# Patient Record
Sex: FEMALE | Race: WHITE | NOT HISPANIC OR LATINO | Employment: FULL TIME | URBAN - METROPOLITAN AREA
[De-identification: names, ages, dates, MRNs, and addresses within clinical notes are randomized per-mention and may not be internally consistent; named-entity substitution may affect disease eponyms.]

---

## 2019-05-12 ENCOUNTER — HOSPITAL ENCOUNTER (EMERGENCY)
Facility: HOSPITAL | Age: 34
Discharge: HOME | End: 2019-05-12
Attending: EMERGENCY MEDICINE
Payer: COMMERCIAL

## 2019-05-12 VITALS
WEIGHT: 115.96 LBS | TEMPERATURE: 97.9 F | OXYGEN SATURATION: 100 % | SYSTOLIC BLOOD PRESSURE: 148 MMHG | BODY MASS INDEX: 21.34 KG/M2 | HEART RATE: 64 BPM | HEIGHT: 62 IN | RESPIRATION RATE: 16 BRPM | DIASTOLIC BLOOD PRESSURE: 81 MMHG

## 2019-05-12 DIAGNOSIS — S56.911A STRAIN OF RIGHT FOREARM, INITIAL ENCOUNTER: Primary | ICD-10-CM

## 2019-05-12 PROCEDURE — 93005 ELECTROCARDIOGRAM TRACING: CPT

## 2019-05-12 PROCEDURE — 99284 EMERGENCY DEPT VISIT MOD MDM: CPT | Performed by: EMERGENCY MEDICINE

## 2019-05-12 PROCEDURE — 99284 EMERGENCY DEPT VISIT MOD MDM: CPT

## 2019-05-12 RX ORDER — NAPROXEN 500 MG/1
500 TABLET ORAL 2 TIMES DAILY PRN
Qty: 15 TABLET | Refills: 0 | Status: SHIPPED | OUTPATIENT
Start: 2019-05-12

## 2019-05-12 RX ORDER — NAPROXEN 250 MG/1
500 TABLET ORAL ONCE
Status: COMPLETED | OUTPATIENT
Start: 2019-05-12 | End: 2019-05-12

## 2019-05-12 RX ADMIN — NAPROXEN 500 MG: 250 TABLET ORAL at 21:40

## 2019-05-13 LAB
ATRIAL RATE: 62 BPM
P AXIS: 28 DEGREES
PR INTERVAL: 192 MS
QRS AXIS: 65 DEGREES
QRSD INTERVAL: 88 MS
QT INTERVAL: 414 MS
QTC INTERVAL: 411 MS
T WAVE AXIS: 29 DEGREES
VENTRICULAR RATE: 59 BPM

## 2019-05-13 PROCEDURE — 93010 ELECTROCARDIOGRAM REPORT: CPT | Performed by: INTERNAL MEDICINE

## 2024-04-18 ENCOUNTER — HOSPITAL ENCOUNTER (EMERGENCY)
Facility: HOSPITAL | Age: 39
Discharge: HOME | End: 2024-04-19
Attending: EMERGENCY MEDICINE
Payer: COMMERCIAL

## 2024-04-18 ENCOUNTER — APPOINTMENT (EMERGENCY)
Dept: RADIOLOGY | Facility: HOSPITAL | Age: 39
End: 2024-04-18
Payer: COMMERCIAL

## 2024-04-18 VITALS
SYSTOLIC BLOOD PRESSURE: 129 MMHG | RESPIRATION RATE: 20 BRPM | TEMPERATURE: 98 F | DIASTOLIC BLOOD PRESSURE: 74 MMHG | HEART RATE: 67 BPM | OXYGEN SATURATION: 93 %

## 2024-04-18 DIAGNOSIS — S61.219A FINGER LACERATION: Primary | ICD-10-CM

## 2024-04-18 PROCEDURE — 99283 EMERGENCY DEPT VISIT LOW MDM: CPT

## 2024-04-18 PROCEDURE — 90715 TDAP VACCINE 7 YRS/> IM: CPT

## 2024-04-18 PROCEDURE — 73140 X-RAY EXAM OF FINGER(S): CPT

## 2024-04-18 PROCEDURE — 90471 IMMUNIZATION ADMIN: CPT

## 2024-04-18 PROCEDURE — 99284 EMERGENCY DEPT VISIT MOD MDM: CPT

## 2024-04-18 RX ORDER — CEPHALEXIN 250 MG/1
500 CAPSULE ORAL ONCE
Status: COMPLETED | OUTPATIENT
Start: 2024-04-18 | End: 2024-04-18

## 2024-04-18 RX ORDER — CEPHALEXIN 500 MG/1
500 CAPSULE ORAL 4 TIMES DAILY
Qty: 20 CAPSULE | Refills: 0 | Status: SHIPPED | OUTPATIENT
Start: 2024-04-18 | End: 2024-04-23

## 2024-04-18 RX ORDER — IBUPROFEN 600 MG/1
600 TABLET ORAL ONCE
Status: COMPLETED | OUTPATIENT
Start: 2024-04-18 | End: 2024-04-18

## 2024-04-18 RX ADMIN — CEPHALEXIN 500 MG: 250 CAPSULE ORAL at 23:18

## 2024-04-18 RX ADMIN — TETANUS TOXOID, REDUCED DIPHTHERIA TOXOID AND ACELLULAR PERTUSSIS VACCINE, ADSORBED 0.5 ML: 5; 2.5; 8; 8; 2.5 SUSPENSION INTRAMUSCULAR at 22:24

## 2024-04-18 RX ADMIN — IBUPROFEN 600 MG: 600 TABLET, FILM COATED ORAL at 23:18

## 2024-04-18 NOTE — Clinical Note
Aletha Farrell was seen and treated in our emergency department on 4/18/2024.                Diagnosis:     Aletha  may return to work on return date.    She may return on this date: 04/22/2024         If you have any questions or concerns, please don't hesitate to call.      Zoya Esqueda PA-C    ______________________________           _______________          _______________  Hospital Representative                              Date                                Time

## 2024-04-19 NOTE — DISCHARGE INSTRUCTIONS
Please follow up with hand specialists regarding further outpatient management and care. Take motrin every 6 hours as needed for pain. Keep wound wrapped and dry until you are evaluated by hand surgery. No swimming or submerging hand. Return to the ER if pain worsens, if you see drainage from the hand or if you develop fevers and chills.

## 2024-04-19 NOTE — ED PROVIDER NOTES
History  Chief Complaint   Patient presents with    Finger Injury     Pt was cutting wood with a chainsaw when the chainsaw jerked back. Laceration to second digit on left hand     Aletha Farrell is a 38 y.o. female with a PMH of ADHD, asthma and allergies presenting to the ED on 2024 with finger injury. Patient endorses that she was cutting wood with a mini chainsaw today when the chainsaw jerked backwards and she lacerated her index finger on her left hand. She states that she avulsed some of the skin and took a chunk of her nail out. Patient states that she felt very faint/woozy afterwards at the site of blood but denies headstrike, fall or vomiting. Patient is right handed. She does not know when her last tentanus shot was. Patient denies blood thinners, bleeding diatheses or any other complaints at this time.            Prior to Admission Medications   Prescriptions Last Dose Informant Patient Reported? Taking?   Mometasone Furo-Formoterol Fum 100-5 MCG/ACT AERO   Yes No   Sig: Inhale 2 puffs as needed   Multiple Vitamins-Minerals (MULTIVITAMIN WITH MINERALS) tablet   Yes No   Sig: Take 2 tablets by mouth daily   VITAMIN C, CALCIUM ASCORBATE, PO   Yes No   Si,000 mg daily   albuterol (PROVENTIL HFA) 90 mcg/act inhaler   Yes No   Sig: Inhale as needed   amphetamine-dextroamphetamine (ADDERALL, 10MG,) 10 mg tablet   Yes No   Sig: 10 mg workdays   fluticasone-salmeterol (ADVAIR DISKUS) 250-50 mcg/dose inhaler   Yes No   Sig: Inhale 1 puff   levalbuterol (XOPENEX) 0.63 mg/3 mL nebulizer solution   Yes No   Si.63 mg as needed   naproxen (NAPROSYN) 500 mg tablet   No No   Sig: Take 1 tablet (500 mg total) by mouth 2 (two) times a day as needed for mild pain for up to 15 doses   potassium-sodium phosphateS (K-PHOS,PHOSPHA 250) 155-852-130 mg tablet   No No   Sig: Take 1 tablet by mouth 4 (four) times a day (with meals and at bedtime) for 3 days   valACYclovir (VALTREX) 500 mg tablet   Yes No   Sig:  Take 500 mg by mouth as needed      Facility-Administered Medications: None       Past Medical History:   Diagnosis Date    ADHD (attention deficit hyperactivity disorder)     Asthma     Environmental and seasonal allergies        Past Surgical History:   Procedure Laterality Date    CYST REMOVAL Left 2014    next to breast       History reviewed. No pertinent family history.  I have reviewed and agree with the history as documented.    E-Cigarette/Vaping     E-Cigarette/Vaping Substances     Social History     Tobacco Use    Smoking status: Never    Smokeless tobacco: Never   Substance Use Topics    Alcohol use: Yes     Alcohol/week: 7.0 standard drinks of alcohol     Types: 7 Glasses of wine per week     Comment: moderately    Drug use: No       Review of Systems   Constitutional:  Negative for chills and fever.   HENT:  Negative for ear pain and sore throat.    Eyes:  Negative for pain and visual disturbance.   Respiratory:  Negative for cough and shortness of breath.    Cardiovascular:  Negative for chest pain and palpitations.   Gastrointestinal:  Positive for nausea. Negative for vomiting.   Genitourinary:  Negative for dysuria and hematuria.   Musculoskeletal:  Negative for arthralgias and back pain.   Skin:  Positive for wound. Negative for color change and rash.   Neurological:  Positive for light-headedness. Negative for dizziness, seizures, syncope, weakness and headaches.   All other systems reviewed and are negative.      Physical Exam  Physical Exam  Vitals and nursing note reviewed.   Constitutional:       General: She is not in acute distress.     Appearance: Normal appearance. She is normal weight. She is not ill-appearing, toxic-appearing or diaphoretic.   HENT:      Head: Normocephalic and atraumatic.      Right Ear: External ear normal.      Left Ear: External ear normal.      Nose: Nose normal. No congestion or rhinorrhea.      Mouth/Throat:      Mouth: Mucous membranes are moist.   Eyes:       General: No scleral icterus.        Right eye: No discharge.         Left eye: No discharge.      Extraocular Movements: Extraocular movements intact.      Conjunctiva/sclera: Conjunctivae normal.   Cardiovascular:      Rate and Rhythm: Normal rate and regular rhythm.      Pulses: Normal pulses.      Comments: Radial pulses 2+ bilaterally  Pulmonary:      Effort: Pulmonary effort is normal. No respiratory distress.      Breath sounds: No wheezing.   Abdominal:      General: Abdomen is flat.   Musculoskeletal:         General: Tenderness and signs of injury present. Normal range of motion.      Cervical back: Normal range of motion and neck supple. No rigidity.      Comments: Patient avulsed lateral portion of nail of left index finger as well as skin on left side of index finger.  Wound was visualized in a bloodless field and patient did not have any exposed bone or tendons. Full ROM of finger and wrist   Skin:     General: Skin is warm.      Capillary Refill: Capillary refill takes less than 2 seconds.      Coloration: Skin is not pale.      Findings: No erythema.   Neurological:      General: No focal deficit present.      Mental Status: She is alert and oriented to person, place, and time. Mental status is at baseline.      Motor: No weakness.      Gait: Gait normal.   Psychiatric:         Mood and Affect: Mood normal.         Behavior: Behavior normal.         Vital Signs  ED Triage Vitals [04/18/24 2142]   Temperature Pulse Respirations Blood Pressure SpO2   98 °F (36.7 °C) 67 20 129/74 93 %      Temp Source Heart Rate Source Patient Position - Orthostatic VS BP Location FiO2 (%)   Oral Monitor -- -- --      Pain Score       --           Vitals:    04/18/24 2142   BP: 129/74   Pulse: 67         Visual Acuity      ED Medications  Medications   tetanus-diphtheria-acellular pertussis (BOOSTRIX) IM injection 0.5 mL (0.5 mL Intramuscular Given 4/18/24 2224)   cephalexin (KEFLEX) capsule 500 mg (500 mg Oral Given  4/18/24 2318)   ibuprofen (MOTRIN) tablet 600 mg (600 mg Oral Given 4/18/24 2318)       Diagnostic Studies  Results Reviewed       None                   XR finger second digit-index LEFT    (Results Pending)              Procedures  Procedures         ED Course                               SBIRT 20yo+      Flowsheet Row Most Recent Value   Initial Alcohol Screen: US AUDIT-C     1. How often do you have a drink containing alcohol? 0 Filed at: 04/18/2024 2148   2. How many drinks containing alcohol do you have on a typical day you are drinking?  0 Filed at: 04/18/2024 2148   3a. Male UNDER 65: How often do you have five or more drinks on one occasion? 0 Filed at: 04/18/2024 2148   3b. FEMALE Any Age, or MALE 65+: How often do you have 4 or more drinks on one occassion? 0 Filed at: 04/18/2024 2148   Audit-C Score 0 Filed at: 04/18/2024 2148   TAM: How many times in the past year have you...    Used an illegal drug or used a prescription medication for non-medical reasons? Never Filed at: 04/18/2024 2148                      Medical Decision Making  Patient seen and examined noted to have finger laceration.  X-ray of patient's finger and hand was ordered here in the department did not reveal any avulsion fractures.  Was able to examine the wound in a bloodless field and did not see any exposed tendons or bone fragments.  [place] texted hand surgeon on-call Dr. Ariana Galindo and she agreed that patient was appropriate for outpatient follow-up in the office.  Gave patient a dose of Keflex here and will have a 5-day course of Keflex at home.  Updated patient's tetanus. Dressed patient's finger and advised her to keep it clean and dry until she can be seen by hand surgery. Strict return precautions were given which patient expressed her understanding of.       Differential diagnosis includes but is not limited to contusion, fracture, sprain, strain    Imaging revealed:   No fractures per my read     Patient appears  "well, is nontoxic appearing, she expresses understanding and agrees with plan of care at this time.  In light of this patient would benefit from outpatient management.    Patient was rx'd: keflex    Patient at time of discharge well-appearing in no acute distress, all questions answered. Patient agreeable to plan.  Patient's vitals, lab/imaging results, diagnosis, and treatment plan were discussed with the patient. All new/changed medications were discussed with patient, specifically, route of administration, how often and when to take, and where they can be picked up. Strict return precautions as well as close follow up with PCP was discussed with the patient and the patient was agreeable to my recommendations. Patient verbally acknowledged understanding of the above communications. Strict return precautions were provided. All labs reviewed and utilized in the medical decision making process (if labs were ordered). Portions of the record may have been created with voice recognition software.  Occasional wrong word or \"sound a like\" substitutions may have occurred due to the inherent limitations of voice recognition software.  Read the chart carefully and recognize, using context, where substitutions have occurred.      Amount and/or Complexity of Data Reviewed  Radiology: ordered.    Risk  Prescription drug management.             Disposition  Final diagnoses:   Finger laceration     Time reflects when diagnosis was documented in both MDM as applicable and the Disposition within this note       Time User Action Codes Description Comment    4/18/2024 11:13 PM Zoya Esqueda Add [S61.219A] Finger laceration           ED Disposition       ED Disposition   Discharge    Condition   Stable    Date/Time   Thu Apr 18, 2024 4567    Comment   Aletha Farrell discharge to home/self care.                   Follow-up Information       Follow up With Specialties Details Why Contact Info Additional Information    [facility] " [facility] Emergency Department Emergency Medicine  If symptoms worsen, As needed [address] [facility] [address]  [phone] Formerly [facility] Emergency Department, [address], [place], [address]            Discharge Medication List as of 4/18/2024 11:27 PM        START taking these medications    Details   cephalexin (KEFLEX) 500 mg capsule Take 1 capsule (500 mg total) by mouth 4 (four) times a day for 5 days, Starting u 4/18/2024, Until Tue 4/23/2024, Normal           CONTINUE these medications which have NOT CHANGED    Details   albuterol (PROVENTIL HFA) 90 mcg/act inhaler Inhale as needed, Starting 4/18/2012, Until Discontinued, Historical Med      amphetamine-dextroamphetamine (ADDERALL, 10MG,) 10 mg tablet 10 mg workdays, Starting 10/26/2016, Until Discontinued, Historical Med      fluticasone-salmeterol (ADVAIR DISKUS) 250-50 mcg/dose inhaler Inhale 1 puff, Until Discontinued, Historical Med      levalbuterol (XOPENEX) 0.63 mg/3 mL nebulizer solution 0.63 mg as needed, Starting 4/18/2012, Until Discontinued, Historical Med      Mometasone Furo-Formoterol Fum 100-5 MCG/ACT AERO Inhale 2 puffs as needed, Until Discontinued, Historical Med      Multiple Vitamins-Minerals (MULTIVITAMIN WITH MINERALS) tablet Take 2 tablets by mouth daily, Until Discontinued, Historical Med      naproxen (NAPROSYN) 500 mg tablet Take 1 tablet (500 mg total) by mouth 2 (two) times a day as needed for mild pain for up to 15 doses, Starting Sun 5/12/2019, Print      potassium-sodium phosphateS (K-PHOS,PHOSPHA 250) 155-852-130 mg tablet Take 1 tablet by mouth 4 (four) times a day (with meals and at bedtime) for 3 days, Starting 12/17/2016, Until Tue 12/20/16, Print      valACYclovir (VALTREX) 500 mg tablet Take 500 mg by mouth as needed, Starting 10/25/2016, Until Discontinued, Historical Med      VITAMIN C, CALCIUM ASCORBATE, PO 1,000 mg daily, Starting 1/12/2015,  Until Discontinued, Historical Med                 PDMP Review       None            ED Provider  Electronically Signed by             Zoya Esqueda PA-C  04/19/24 0136